# Patient Record
Sex: FEMALE | Race: WHITE | URBAN - METROPOLITAN AREA
[De-identification: names, ages, dates, MRNs, and addresses within clinical notes are randomized per-mention and may not be internally consistent; named-entity substitution may affect disease eponyms.]

---

## 2023-03-27 ENCOUNTER — OFFICE VISIT (OUTPATIENT)
Dept: URBAN - METROPOLITAN AREA CLINIC 90 | Facility: CLINIC | Age: 43
End: 2023-03-27
Payer: COMMERCIAL

## 2023-03-27 DIAGNOSIS — B30.8 OTHER VIRAL CONJUNCTIVITIS: Primary | ICD-10-CM

## 2023-03-27 PROCEDURE — 99203 OFFICE O/P NEW LOW 30 MIN: CPT | Performed by: OPHTHALMOLOGY

## 2023-03-27 RX ORDER — NEOMYCIN SULFATE, POLYMYXIN B SULFATE AND DEXAMETHASONE 3.5; 10000; 1 MG/ML; [USP'U]/ML; MG/ML
SUSPENSION OPHTHALMIC
Qty: 5 | Refills: 0 | Status: ACTIVE
Start: 2023-03-27

## 2023-03-27 ASSESSMENT — INTRAOCULAR PRESSURE
OD: 18
OS: 18

## 2023-03-27 NOTE — IMPRESSION/PLAN
Impression: Other viral conjunctivitis: B30.8. Plan: For bacterial conjunctivitis OU, I have stressed the contagious nature of the infection, and patient should wash their hands frequently in order to avoid spreading to others. I have also given patient Rx for topical antibiotic to use as directed. Start maxitrol gtts TID OU.